# Patient Record
Sex: FEMALE | Race: WHITE | NOT HISPANIC OR LATINO | Employment: FULL TIME | URBAN - METROPOLITAN AREA
[De-identification: names, ages, dates, MRNs, and addresses within clinical notes are randomized per-mention and may not be internally consistent; named-entity substitution may affect disease eponyms.]

---

## 2024-01-12 ENCOUNTER — OFFICE VISIT (OUTPATIENT)
Dept: OBSTETRICS AND GYNECOLOGY | Facility: CLINIC | Age: 24
End: 2024-01-12
Payer: COMMERCIAL

## 2024-01-12 VITALS — WEIGHT: 152.81 LBS | DIASTOLIC BLOOD PRESSURE: 77 MMHG | HEART RATE: 77 BPM | SYSTOLIC BLOOD PRESSURE: 123 MMHG

## 2024-01-12 DIAGNOSIS — N91.4 SECONDARY OLIGOMENORRHEA: ICD-10-CM

## 2024-01-12 DIAGNOSIS — Z01.419 WELL WOMAN EXAM WITH ROUTINE GYNECOLOGICAL EXAM: Primary | ICD-10-CM

## 2024-01-12 DIAGNOSIS — Z11.3 ENCOUNTER FOR SCREENING FOR INFECTIONS WITH PREDOMINANTLY SEXUAL MODE OF TRANSMISSION: ICD-10-CM

## 2024-01-12 PROCEDURE — 3078F DIAST BP <80 MM HG: CPT | Mod: CPTII,S$GLB,,

## 2024-01-12 PROCEDURE — 3074F SYST BP LT 130 MM HG: CPT | Mod: CPTII,S$GLB,,

## 2024-01-12 PROCEDURE — 1159F MED LIST DOCD IN RCRD: CPT | Mod: CPTII,S$GLB,,

## 2024-01-12 PROCEDURE — 1160F RVW MEDS BY RX/DR IN RCRD: CPT | Mod: CPTII,S$GLB,,

## 2024-01-12 PROCEDURE — 99385 PREV VISIT NEW AGE 18-39: CPT | Mod: S$GLB,,,

## 2024-01-12 NOTE — PROGRESS NOTES
Subjective:      Patient ID: Charlene Smith is a 23 y.o. female who presents for evaluation today.    Chief Complaint:    Well Woman, Contraception (Pt would like to discuss possibly getting on birth control.), and Irregular cycles (Pt states that her cycles are not painful. They're just not very consistent.)      History of Present Illness  Annual Exam (Premenopausal) - Patient presents for annual exam. The patient also has complaints of irregular cycles. The patient is sexually active. GYN screening history: no prior history of gyn screening tests. The patient wears seatbelts: yes. The patient participates in regular exercise: no. Has the patient ever been transfused or tattooed?: not asked. The patient reports that there is not domestic violence in her life. She reports irregular cycles for many years. She is often 1-2 weeks late. Her periods last 4-7 days, light flow. She denies abnormal hair growth or weight gain. Her last period was last week. She may be interested in birth control but wants to think about it.     GYN History  Patient's last menstrual period was 01/02/2024 (exact date).   Date of Last Pap: Pap smear completed today with HPV co-testing with screening for gonorrhea, chlamydia and trichomoniasis per CDC guidelines    VITALS  /77   Pulse 77   Wt 69.3 kg (152 lb 12.8 oz)   LMP 01/02/2024 (Exact Date)   Weight: 69.3 kg (152 lb 12.8 oz)         PAST MEDICAL HISTORY  History reviewed. No pertinent past medical history.    PAST SURGICAL HISTORY  History reviewed. No pertinent surgical history.    SOCIAL HISTORY  Social History     Tobacco Use   Smoking Status Never   Smokeless Tobacco Never   ,   Social History     Substance and Sexual Activity   Alcohol Use Never        MEDICATIONS  No outpatient medications have been marked as taking for the 1/12/24 encounter (Office Visit) with Emma Singleton NP.         Review of Systems   Review of Systems   Constitutional:  Negative for activity  change, chills and fever.   Eyes:  Negative for visual disturbance.   Respiratory:  Negative for shortness of breath.    Cardiovascular:  Negative for chest pain.   Gastrointestinal:  Negative for abdominal pain, constipation, diarrhea, nausea and vomiting.   Genitourinary:  Positive for menstrual problem (oligomenorrhea). Negative for dysuria, flank pain, frequency, hematuria, menorrhagia, pelvic pain, urgency, vaginal bleeding and vaginal discharge.        No abnormal vaginal bleeding   Musculoskeletal:  Negative for back pain.   Integumentary:  Negative for rash and breast mass.   Neurological:  Negative for numbness and headaches.   Psychiatric/Behavioral:          No mood disturbance or changes    Breast: Negative for mass          Objective:     Physical Exam:   Constitutional: She is oriented to person, place, and time. She appears well-developed. She is cooperative.    HENT:   Head: Normocephalic.     Neck: Trachea normal. No thyromegaly present.    Cardiovascular:  Normal rate, regular rhythm and normal heart sounds.             Pulmonary/Chest: Effort normal and breath sounds normal. Right breast exhibits no mass, no nipple discharge and no skin change. Left breast exhibits no mass, no nipple discharge and no skin change.        Abdominal: Soft. There is no abdominal tenderness. There is no rebound and no guarding.     Genitourinary:    Vagina and uterus normal.      Pelvic exam was performed with patient supine.   The external female genitalia was normal.     Labial bartholins normal.There is no lesion on the right labia. There is no lesion on the left labia. Cervix is normal. Right adnexum displays no mass and no tenderness. Left adnexum displays no mass and no tenderness. No vaginal discharge in the vagina. Cervix exhibits no discharge.    pap smear completedUterus is not enlarged and not tender.              Lymphadenopathy:        Head (right side): No submental and no submandibular adenopathy  present.        Head (left side): No submental and no submandibular adenopathy present.     She has no cervical adenopathy.    Neurological: She is alert and oriented to person, place, and time.    Skin: Skin is warm.    Psychiatric: She has a normal mood and affect. Her speech is normal and behavior is normal. Thought content normal.          Assessment:        1. Well woman exam with routine gynecological exam    2. Secondary oligomenorrhea    3. Encounter for screening for infections with predominantly sexual mode of transmission      Well woman exam with routine gynecological exam  -     Vitamin D; Future; Expected date: 01/12/2024  -     CBC Auto Differential; Future; Expected date: 01/12/2024  -     Comprehensive Metabolic Panel; Future; Expected date: 01/12/2024  -     Urinalysis, Reflex to Urine Culture Urine, Clean Catch; Future; Expected date: 01/12/2024  -     Lipid Panel; Future; Expected date: 01/12/2024  -     Liquid-based pap smear, screening  -     Cancel: Liquid-based pap smear, screening    Secondary oligomenorrhea  -     T4, Free; Future; Expected date: 01/12/2024  -     TSH; Future; Expected date: 01/12/2024  -     Follicle Stimulating Hormone; Future; Expected date: 01/12/2024  -     Estradiol; Future; Expected date: 01/12/2024  -     DHEA-Sulfate; Future; Expected date: 01/12/2024  -     Testosterone; Future; Expected date: 01/12/2024  -     Testosterone, Free; Future; Expected date: 01/12/2024  -     Prolactin; Future; Expected date: 01/12/2024  -     Insulin, Random; Future; Expected date: 01/12/2024  -     POCT urine pregnancy  -     HCG, Serum, Qualitative; Future; Expected date: 01/12/2024  -     US OB/GYN Procedure ([facility]); Future    Encounter for screening for infections with predominantly sexual mode of transmission  -     C. trachomatis/N. gonorrhoeae by AMP DNA Other; Cervicovaginal          Plan:     Patient instructed to contact the clinic should any questions or concerns arise  prior to her next office visit. Patient is happy with the plan of care at this time, verbalizes understanding and denies outstanding questions.      Pap  Self-breast exams  Birth Control options vs Prometrium discussed for cycle regulation, she will decide & let us know  If you don't hear from the office regarding results within 1 week, please call  Follow up in 1 year for annual or sooner as needed  Chaperone present for exam

## 2024-01-18 LAB
CHLAMYDIA: NEGATIVE
GONORRHEA: NEGATIVE
Lab: NORMAL
SOURCE: NORMAL

## 2024-12-03 ENCOUNTER — TELEPHONE (OUTPATIENT)
Dept: OBSTETRICS AND GYNECOLOGY | Facility: CLINIC | Age: 24
End: 2024-12-03
Payer: COMMERCIAL

## 2024-12-03 NOTE — TELEPHONE ENCOUNTER
Called pt to reschedule her marylin on 01/17/25 due to nando being out of the office. pt did not answer left vm and messaged pt via portal